# Patient Record
Sex: FEMALE | Race: BLACK OR AFRICAN AMERICAN | NOT HISPANIC OR LATINO | Employment: UNEMPLOYED | URBAN - METROPOLITAN AREA
[De-identification: names, ages, dates, MRNs, and addresses within clinical notes are randomized per-mention and may not be internally consistent; named-entity substitution may affect disease eponyms.]

---

## 2021-09-28 LAB
ABS NEUT (OLG): 4.33 X10(3)/MCL (ref 2.1–9.2)
BASOPHILS # BLD AUTO: 0.05 X10(3)/MCL (ref 0–0.2)
BASOPHILS NFR BLD AUTO: 0.8 % (ref 0–1)
BUN SERPL-MCNC: 9 MG/DL (ref 7–18.7)
CALCIUM SERPL-MCNC: 9.3 MG/DL (ref 8.4–10.2)
CHLORIDE SERPL-SCNC: 103 MMOL/L (ref 98–107)
CO2 SERPL-SCNC: 30 MMOL/L (ref 22–29)
CREAT SERPL-MCNC: 0.51 MG/DL (ref 0.57–1.11)
CREAT/UREA NIT SERPL: 18
EOSINOPHIL # BLD AUTO: 0.42 X10(3)/MCL (ref 0–0.9)
EOSINOPHIL NFR BLD AUTO: 6.8 % (ref 0–6.4)
ERYTHROCYTE [DISTWIDTH] IN BLOOD BY AUTOMATED COUNT: 20 % (ref 11.5–17)
GLUCOSE SERPL-MCNC: 105 MG/DL (ref 74–100)
HCT VFR BLD AUTO: 28.4 % (ref 37–47)
HGB BLD-MCNC: 8.5 GM/DL (ref 12–16)
HYPOCHROMIA BLD QL SMEAR: ABNORMAL
IMM GRANULOCYTES # BLD AUTO: 0.06 10*3/UL (ref 0–0.02)
IMM GRANULOCYTES NFR BLD AUTO: 1 % (ref 0–0.43)
LYMPHOCYTES # BLD AUTO: 0.44 X10(3)/MCL (ref 0.6–4.6)
LYMPHOCYTES NFR BLD AUTO: 7.2 % (ref 16–44)
MACROCYTES BLD QL SMEAR: ABNORMAL
MAGNESIUM SERPL-MCNC: 2 MG/DL (ref 1.6–2.6)
MCH RBC QN AUTO: 28.1 PG (ref 27–31)
MCHC RBC AUTO-ENTMCNC: 29.9 GM/DL (ref 33–36)
MCV RBC AUTO: 93.7 FL (ref 80–94)
MONOCYTES # BLD AUTO: 0.85 X10(3)/MCL (ref 0.1–1.3)
MONOCYTES NFR BLD AUTO: 13.8 % (ref 4–12.1)
NEUTROPHILS # BLD AUTO: 4.33 X10(3)/MCL (ref 2.1–9.2)
NEUTROPHILS NFR BLD AUTO: 70.4 % (ref 43–73)
NRBC BLD AUTO-RTO: 0 % (ref 0–0.2)
PHOSPHATE SERPL-MCNC: 5.5 MG/DL (ref 2.3–4.7)
PLATELET # BLD AUTO: 490 X10(3)/MCL (ref 130–400)
PLATELET # BLD EST: ABNORMAL 10*3/UL
PMV BLD AUTO: 9.3 FL (ref 7.4–10.4)
POLYCHROMASIA BLD QL SMEAR: SLIGHT
POTASSIUM SERPL-SCNC: 4 MMOL/L (ref 3.5–5.1)
PREALB SERPL-MCNC: 25.3 MG/DL (ref 16–38)
RBC # BLD AUTO: 3.03 X10(6)/MCL (ref 4.2–5.4)
RBC MORPH BLD: ABNORMAL
SODIUM SERPL-SCNC: 143 MMOL/L (ref 136–145)
TARGETS BLD QL SMEAR: SLIGHT
VANCOMYCIN SERPL-MCNC: 17.6 UG/ML
WBC # SPEC AUTO: 6.2 X10(3)/MCL (ref 4.5–11.5)

## 2021-09-29 LAB
ABS NEUT (OLG): 3.95 X10(3)/MCL (ref 2.1–9.2)
ALBUMIN SERPL-MCNC: 1.9 GM/DL (ref 3.5–5)
BUN SERPL-MCNC: 8.2 MG/DL (ref 7–18.7)
CALCIUM SERPL-MCNC: 9 MG/DL (ref 8.4–10.2)
CHLORIDE SERPL-SCNC: 104 MMOL/L (ref 98–107)
CO2 SERPL-SCNC: 26 MMOL/L (ref 22–29)
CREAT SERPL-MCNC: 0.7 MG/DL (ref 0.57–1.11)
ERYTHROCYTE [DISTWIDTH] IN BLOOD BY AUTOMATED COUNT: 19.1 % (ref 11.5–17)
GLUCOSE SERPL-MCNC: 93 MG/DL (ref 74–100)
HCT VFR BLD AUTO: 30.2 % (ref 37–47)
HGB BLD-MCNC: 8.9 GM/DL (ref 12–16)
HYPOCHROMIA BLD QL SMEAR: ABNORMAL
MACROCYTES BLD QL SMEAR: ABNORMAL
MAGNESIUM SERPL-MCNC: 1.8 MG/DL (ref 1.6–2.6)
MCH RBC QN AUTO: 28.1 PG (ref 27–31)
MCHC RBC AUTO-ENTMCNC: 29.5 GM/DL (ref 33–36)
MCV RBC AUTO: 95.3 FL (ref 80–94)
MICROCYTES BLD QL SMEAR: ABNORMAL
NRBC BLD AUTO-RTO: 0 % (ref 0–0.2)
PHOSPHATE SERPL-MCNC: 4.1 MG/DL (ref 2.3–4.7)
PLATELET # BLD AUTO: 413 X10(3)/MCL (ref 130–400)
PLATELET # BLD EST: ABNORMAL 10*3/UL
PMV BLD AUTO: 9 FL (ref 7.4–10.4)
POLYCHROMASIA BLD QL SMEAR: SLIGHT
POTASSIUM SERPL-SCNC: 3.7 MMOL/L (ref 3.5–5.1)
PREALB SERPL-MCNC: 23.7 MG/DL (ref 16–38)
RBC # BLD AUTO: 3.17 X10(6)/MCL (ref 4.2–5.4)
RBC MORPH BLD: ABNORMAL
RBC MORPH BLD: ABNORMAL
SODIUM SERPL-SCNC: 140 MMOL/L (ref 136–145)
TARGETS BLD QL SMEAR: SLIGHT
WBC # SPEC AUTO: 6.1 X10(3)/MCL (ref 4.5–11.5)

## 2021-09-30 LAB — VANCOMYCIN TROUGH SERPL-MCNC: 10.1 UG/ML (ref 5–10)

## 2021-10-01 LAB — VANCOMYCIN TROUGH SERPL-MCNC: 13.9 UG/ML (ref 5–10)

## 2021-10-03 LAB
FOLATE SERPL-MCNC: 13.8 NG/ML (ref 7–31.4)
VANCOMYCIN TROUGH SERPL-MCNC: 11.5 UG/ML (ref 5–10)
VIT B12 SERPL-MCNC: 604 PG/ML (ref 213–816)

## 2021-10-04 LAB — VANCOMYCIN TROUGH SERPL-MCNC: 12.1 UG/ML (ref 5–10)

## 2021-10-05 LAB — VANCOMYCIN TROUGH SERPL-MCNC: 14.4 UG/ML (ref 5–10)

## 2021-10-06 LAB — VANCOMYCIN TROUGH SERPL-MCNC: 23.2 UG/ML (ref 5–10)

## 2021-10-08 LAB — VANCOMYCIN TROUGH SERPL-MCNC: 14.1 UG/ML (ref 5–10)

## 2021-10-11 ENCOUNTER — HISTORICAL (OUTPATIENT)
Dept: ADMINISTRATIVE | Facility: HOSPITAL | Age: 46
End: 2021-10-11

## 2021-10-11 LAB
ABS NEUT (OLG): 3.45 X10(3)/MCL (ref 2.1–9.2)
ALBUMIN SERPL-MCNC: 2 GM/DL (ref 3.5–5)
BUN SERPL-MCNC: 5.4 MG/DL (ref 7–18.7)
CALCIUM SERPL-MCNC: 9 MG/DL (ref 8.4–10.2)
CHLORIDE SERPL-SCNC: 106 MMOL/L (ref 98–107)
CO2 SERPL-SCNC: 27 MMOL/L (ref 22–29)
CREAT SERPL-MCNC: 0.47 MG/DL (ref 0.57–1.11)
ERYTHROCYTE [DISTWIDTH] IN BLOOD BY AUTOMATED COUNT: 16.1 % (ref 11.5–17)
GLUCOSE SERPL-MCNC: 90 MG/DL (ref 74–100)
HCT VFR BLD AUTO: 32.2 % (ref 37–47)
HGB BLD-MCNC: 9.6 GM/DL (ref 12–16)
MAGNESIUM SERPL-MCNC: 1.8 MG/DL (ref 1.6–2.6)
MCH RBC QN AUTO: 27.9 PG (ref 27–31)
MCHC RBC AUTO-ENTMCNC: 29.8 GM/DL (ref 33–36)
MCV RBC AUTO: 93.6 FL (ref 80–94)
NRBC BLD AUTO-RTO: 0 % (ref 0–0.2)
PHOSPHATE SERPL-MCNC: 4.2 MG/DL (ref 2.3–4.7)
PLATELET # BLD AUTO: 469 X10(3)/MCL (ref 130–400)
PMV BLD AUTO: 8.9 FL (ref 7.4–10.4)
POTASSIUM SERPL-SCNC: 3.5 MMOL/L (ref 3.5–5.1)
PREALB SERPL-MCNC: 12.2 MG/DL (ref 16–38)
RBC # BLD AUTO: 3.44 X10(6)/MCL (ref 4.2–5.4)
SODIUM SERPL-SCNC: 143 MMOL/L (ref 136–145)
WBC # SPEC AUTO: 4.8 X10(3)/MCL (ref 4.5–11.5)

## 2021-10-18 LAB
ABS NEUT (OLG): 6.95 X10(3)/MCL (ref 2.1–9.2)
BASOPHILS # BLD AUTO: 0.03 X10(3)/MCL (ref 0–0.2)
BASOPHILS NFR BLD AUTO: 0.4 % (ref 0–1)
BUN SERPL-MCNC: 8.6 MG/DL (ref 7–18.7)
CALCIUM SERPL-MCNC: 9.7 MG/DL (ref 8.4–10.2)
CHLORIDE SERPL-SCNC: 98 MMOL/L (ref 98–107)
CO2 SERPL-SCNC: 29 MMOL/L (ref 22–29)
CREAT SERPL-MCNC: 0.65 MG/DL (ref 0.57–1.11)
CREAT/UREA NIT SERPL: 13
EOSINOPHIL # BLD AUTO: 0.05 X10(3)/MCL (ref 0–0.9)
EOSINOPHIL NFR BLD AUTO: 0.6 % (ref 0–6.4)
ERYTHROCYTE [DISTWIDTH] IN BLOOD BY AUTOMATED COUNT: 15.2 % (ref 11.5–17)
GLUCOSE SERPL-MCNC: 112 MG/DL (ref 74–100)
HCT VFR BLD AUTO: 34.8 % (ref 37–47)
HGB BLD-MCNC: 10.6 GM/DL (ref 12–16)
IMM GRANULOCYTES # BLD AUTO: 0.04 10*3/UL (ref 0–0.02)
IMM GRANULOCYTES NFR BLD AUTO: 0.5 % (ref 0–0.43)
LYMPHOCYTES # BLD AUTO: 0.38 X10(3)/MCL (ref 0.6–4.6)
LYMPHOCYTES NFR BLD AUTO: 4.6 % (ref 16–44)
MCH RBC QN AUTO: 27.8 PG (ref 27–31)
MCHC RBC AUTO-ENTMCNC: 30.5 GM/DL (ref 33–36)
MCV RBC AUTO: 91.3 FL (ref 80–94)
MONOCYTES # BLD AUTO: 0.8 X10(3)/MCL (ref 0.1–1.3)
MONOCYTES NFR BLD AUTO: 9.7 % (ref 4–12.1)
NEUTROPHILS # BLD AUTO: 6.95 X10(3)/MCL (ref 2.1–9.2)
NEUTROPHILS NFR BLD AUTO: 84.2 % (ref 43–73)
NRBC BLD AUTO-RTO: 0 % (ref 0–0.2)
PLATELET # BLD AUTO: 464 X10(3)/MCL (ref 130–400)
PMV BLD AUTO: 9.3 FL (ref 7.4–10.4)
POTASSIUM SERPL-SCNC: 4.9 MMOL/L (ref 3.5–5.1)
RBC # BLD AUTO: 3.81 X10(6)/MCL (ref 4.2–5.4)
SODIUM SERPL-SCNC: 136 MMOL/L (ref 136–145)
WBC # SPEC AUTO: 8.2 X10(3)/MCL (ref 4.5–11.5)

## 2021-10-19 LAB — PREALB SERPL-MCNC: 13.3 MG/DL (ref 16–38)

## 2021-10-25 LAB
ABS NEUT (OLG): 3.55 X10(3)/MCL (ref 2.1–9.2)
ALBUMIN SERPL-MCNC: 2.6 GM/DL (ref 3.5–5)
BUN SERPL-MCNC: 14.5 MG/DL (ref 7–18.7)
CALCIUM SERPL-MCNC: 10.4 MG/DL (ref 8.4–10.2)
CHLORIDE SERPL-SCNC: 99 MMOL/L (ref 98–107)
CO2 SERPL-SCNC: 27 MMOL/L (ref 22–29)
CREAT SERPL-MCNC: 0.65 MG/DL (ref 0.57–1.11)
ERYTHROCYTE [DISTWIDTH] IN BLOOD BY AUTOMATED COUNT: 14.7 % (ref 11.5–17)
GLUCOSE SERPL-MCNC: 98 MG/DL (ref 74–100)
HCT VFR BLD AUTO: 33.4 % (ref 37–47)
HGB BLD-MCNC: 10.5 GM/DL (ref 12–16)
MAGNESIUM SERPL-MCNC: 2.2 MG/DL (ref 1.6–2.6)
MCH RBC QN AUTO: 27.7 PG (ref 27–31)
MCHC RBC AUTO-ENTMCNC: 31.4 GM/DL (ref 33–36)
MCV RBC AUTO: 88.1 FL (ref 80–94)
NRBC BLD AUTO-RTO: 0 % (ref 0–0.2)
PHOSPHATE SERPL-MCNC: 5.6 MG/DL (ref 2.3–4.7)
PLATELET # BLD AUTO: 501 X10(3)/MCL (ref 130–400)
PMV BLD AUTO: 9.2 FL (ref 7.4–10.4)
POTASSIUM SERPL-SCNC: 4.3 MMOL/L (ref 3.5–5.1)
PREALB SERPL-MCNC: 19 MG/DL (ref 16–38)
RBC # BLD AUTO: 3.79 X10(6)/MCL (ref 4.2–5.4)
SODIUM SERPL-SCNC: 138 MMOL/L (ref 136–145)
WBC # SPEC AUTO: 5.1 X10(3)/MCL (ref 4.5–11.5)

## 2021-11-05 ENCOUNTER — HISTORICAL (OUTPATIENT)
Dept: ADMINISTRATIVE | Facility: HOSPITAL | Age: 46
End: 2021-11-05

## 2021-11-15 ENCOUNTER — TELEPHONE (OUTPATIENT)
Dept: ORTHOPEDICS | Facility: CLINIC | Age: 46
End: 2021-11-15

## 2021-12-06 ENCOUNTER — HISTORICAL (OUTPATIENT)
Dept: ADMINISTRATIVE | Facility: HOSPITAL | Age: 46
End: 2021-12-06

## 2021-12-06 LAB
ABS NEUT (OLG): 3.06 X10(3)/MCL (ref 2.1–9.2)
ALBUMIN SERPL-MCNC: 3.2 GM/DL (ref 3.5–5)
ALBUMIN/GLOB SERPL: 0.6 RATIO (ref 1.1–2)
ALP SERPL-CCNC: 379 UNIT/L (ref 40–150)
ALT SERPL-CCNC: 125 UNIT/L (ref 0–55)
AST SERPL-CCNC: 65 UNIT/L (ref 5–34)
BASOPHILS # BLD AUTO: 0 X10(3)/MCL (ref 0–0.2)
BASOPHILS NFR BLD AUTO: 0 %
BILIRUB SERPL-MCNC: 0.4 MG/DL
BILIRUBIN DIRECT+TOT PNL SERPL-MCNC: 0.2 MG/DL (ref 0–0.5)
BILIRUBIN DIRECT+TOT PNL SERPL-MCNC: 0.2 MG/DL (ref 0–0.8)
BUN SERPL-MCNC: 12.5 MG/DL (ref 7–18.7)
CALCIUM SERPL-MCNC: 10.8 MG/DL (ref 8.7–10.5)
CHLORIDE SERPL-SCNC: 103 MMOL/L (ref 98–107)
CHOLEST SERPL-MCNC: 281 MG/DL
CHOLEST/HDLC SERPL: 8 {RATIO} (ref 0–5)
CO2 SERPL-SCNC: 25 MMOL/L (ref 22–29)
CREAT SERPL-MCNC: 0.55 MG/DL (ref 0.55–1.02)
DEPRECATED CALCIDIOL+CALCIFEROL SERPL-MC: 31.1 NG/ML (ref 30–80)
EOSINOPHIL # BLD AUTO: 0.3 X10(3)/MCL (ref 0–0.9)
EOSINOPHIL NFR BLD AUTO: 7 %
ERYTHROCYTE [DISTWIDTH] IN BLOOD BY AUTOMATED COUNT: 16.4 % (ref 11.5–14.5)
EST. AVERAGE GLUCOSE BLD GHB EST-MCNC: 93.9 MG/DL
GLOBULIN SER-MCNC: 5.2 GM/DL (ref 2.4–3.5)
GLUCOSE SERPL-MCNC: 85 MG/DL (ref 74–100)
HBA1C MFR BLD: 4.9 %
HCT VFR BLD AUTO: 36.2 % (ref 35–46)
HDLC SERPL-MCNC: 35 MG/DL (ref 35–60)
HGB BLD-MCNC: 11.6 GM/DL (ref 12–16)
IMM GRANULOCYTES # BLD AUTO: 0.02 10*3/UL
IMM GRANULOCYTES NFR BLD AUTO: 0 %
LDLC SERPL CALC-MCNC: 211 MG/DL (ref 50–140)
LYMPHOCYTES # BLD AUTO: 0.4 X10(3)/MCL (ref 0.6–4.6)
LYMPHOCYTES NFR BLD AUTO: 10 %
MCH RBC QN AUTO: 27.7 PG (ref 26–34)
MCHC RBC AUTO-ENTMCNC: 32 GM/DL (ref 31–37)
MCV RBC AUTO: 86.4 FL (ref 80–100)
MONOCYTES # BLD AUTO: 0.5 X10(3)/MCL (ref 0.1–1.3)
MONOCYTES NFR BLD AUTO: 12 %
NEUTROPHILS # BLD AUTO: 3.06 X10(3)/MCL (ref 2.1–9.2)
NEUTROPHILS NFR BLD AUTO: 70 %
NRBC BLD AUTO-RTO: 0 % (ref 0–0.2)
PLATELET # BLD AUTO: 493 X10(3)/MCL (ref 130–400)
PMV BLD AUTO: 8.9 FL (ref 7.4–10.4)
POTASSIUM SERPL-SCNC: 4.7 MMOL/L (ref 3.5–5.1)
PROT SERPL-MCNC: 8.4 GM/DL (ref 6.4–8.3)
RBC # BLD AUTO: 4.19 X10(6)/MCL (ref 4–5.2)
SODIUM SERPL-SCNC: 137 MMOL/L (ref 136–145)
T4 FREE SERPL-MCNC: 0.94 NG/DL (ref 0.7–1.48)
TRIGL SERPL-MCNC: 175 MG/DL (ref 37–140)
TSH SERPL-ACNC: 1.02 UIU/ML (ref 0.35–4.94)
VIT B12 SERPL-MCNC: 353 PG/ML (ref 213–816)
VLDLC SERPL CALC-MCNC: 35 MG/DL
WBC # SPEC AUTO: 4.4 X10(3)/MCL (ref 4.5–11)

## 2021-12-22 ENCOUNTER — HISTORICAL (OUTPATIENT)
Dept: RADIOLOGY | Facility: HOSPITAL | Age: 46
End: 2021-12-22

## 2022-04-11 ENCOUNTER — HISTORICAL (OUTPATIENT)
Dept: ADMINISTRATIVE | Facility: HOSPITAL | Age: 47
End: 2022-04-11
Payer: MEDICAID

## 2022-04-25 VITALS
OXYGEN SATURATION: 96 % | HEIGHT: 61 IN | SYSTOLIC BLOOD PRESSURE: 112 MMHG | WEIGHT: 183 LBS | BODY MASS INDEX: 34.55 KG/M2 | DIASTOLIC BLOOD PRESSURE: 78 MMHG

## 2022-05-05 NOTE — HISTORICAL [facility] CERNER
This is a historical note converted from Alicia. Formatting and pictures may have been removed.  Please reference Alicia for original formatting and attached multimedia. Chief Complaint  Transferred to [facility] for continuation of?wound care?from [facility]  History of Present Illness  ?45-year-old -American female developed a?lesion to her left thigh?and March 2021.? 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 ?On May 18, 2021?patient developed significant shortness of breath?and presented to?[facility]?she was noted to have a hemoglobin of 3 and transferred to [facility]?for?further treatment?where she has remained there until?9/27/2021?at which time she was transferred to [facility] here.? She had a complicated prolonged stay at [facility]?her course was complicated by severe anemia?requiring transfusions,?and IVC filter placement.? She did have embolization of the sarcoma with radiation therapy which was completed on July 2, 2021.? She was treated?with a few sessions of hyperbaric oxygen therapy.? She developed a?left thigh/inguinal wound?and the initial plans for surgical intervention?changed?to?a hip disarticulation?in August 2021. While in the OR?for hip disarticulation surgery,?it was discovered she did not have enough healthy tissue?to allow for disarticulation?so a debulking surgery was performed instead with no plan to attempt?wound healing prior to another surgical attempt.? She underwent?a total of 5 operative debridements?between?August 13 and August 25. ?She apparently suffered a tear of the femoral artery requiring?repair with wound closure and drain placement and?8/28/2021. ?Patient went?in?she was treated with courses of IV antibiotics including vancomycin and cefepime?with Flagyl.? She was also?started on cefazolin for cellulitis of the left lower extremity with  end date?on October 9.? During her?hospitalization she also developed a sacral pressure ulcer and pressure ulcer to the?posterior thigh/buttock region.? She was transferred to [facility]?9/27/2021 for continuation of care.? Of note patient is originally from [place]  ?   Wound medicine consulted?9/29/2021 by the wound care RN to?evaluate and treat?wounds to sacrum, left buttock and thigh,?left hip?and the radiation wound to the left?inguinal region.? She was transferred to [facility] still with a?PATRICIA drain in place to her left hip?and?sutures in place to the anterior and posterior thigh where the hip disarticulation surgery was attempted.? She was placed on low-air-loss mattress.  ?   Today: Patient seen today for initial consult.? Spent a good bit of time reviewing history with patient. ?She?reports pain with turning and repositioning and was tearful?as soon as we moved her?to assess her buttock. ?She was premedicated with MS Contin prior to the wound assessment. ?She reports appetite is good. ?Discussed with physical therapy they got her to chair yesterday and already ordered a Lee mat?and limited sitting time to 1 hour at a time.? Patient request no silver alginate dressing states that they burn and?does not want to use that treatment.  ?  Review of Systems  Negative except as detailed in HPI, all other systems reviewed and negative  Physical Exam  Vitals & Measurements  T:?37.0? ?C (Oral)? TMIN:?36.4? ?C (Oral)? TMAX:?37.0? ?C (Oral)? HR:?129(Peripheral)? RR:?18? BP:?137/88? SpO2:?97%?  General: Pleasant young female, NAD except if wounds are are touched or she is repositioned. ?She was tearful at the time of exam  Neurologic/Psychiatric: Patient is alert and oriented to person, place, time. Cooperative. Cranial nerves II-XII intact.  Head/Eyes/Ears/Nose/Mouth/Throat:? Normocephalic, atraumatic.  Cardiovascular: No JVD. ?Regular rate and rhythm.?  Peripheral vascular: RLE?with trace edema. ?No discoloration or cellulitis  present. 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  Respiratory: Respirations even and unlabored, no use of accessory muscles. Trachea midline.??  Gastrointestinal: Abdomen round, symmetric.??  Genitourinary:?Some urinary incontinence?with contamination of wounds  Musculoskeletal:FROM; motion of the?left?lower extremity stump limited by pain  Skin: Warm/dry.? PATRICIA drain in place to the left hip region with serosanguineous drainage.? Anterior and posterior thigh surgical incisions with?sutures in place.??Left?inguinal/groin?wound. ?Sacral wound with dressing?and a left?buttock/posterior thigh wound present.  ?  WOUND # 1 sacrum pressure ulcer stage III:?1.1 x 0.5 x 0.9 cm with?undermining from 11 to 1 oclock position at 2.0 cm.? Wound bed with red granulation tissue,?and serosanguineous drainage. ?No periwound maceration, induration or cellulitis.? No odor. ?No underlying structures visualized.  ?  WOUND #2 left groin radiation?wound:?4 x 4 x 0.1 cm.? Irregular shaped wound with waxy yellow-white?fibrotic?coating with scant serosanguineous drainage.? Periwound with thin skin. ?No underlying structures exposed.? No odor. ?No periwound maceration, induration or cellulitis  ?  WOUND # 3 left posterior thigh surgical wound:?0.2 x 16 x 3.0 cm.? Horizontally oriented?incision?along the stump of the amputation?with sutures in place. ?A few areas?of dehiscence that?are able to probe deeper into the wound, no drainage, no redness heat or discoloration around?the incision line.? No lateral tracking  ?  WOUND # 4 left buttock/posterior thigh stage III pressure ulcer:?14.5 x 12.5 x 0.1 cm.? Large wound extending from the buttocks down to the posterior thigh?with 50% red granulation, 50%?slough?with scant serosanguineous?in nature.? No underlying structures exposed. ?No periwound maceration, induration or cellulitis  ?  WOUND # 5 left anterior thigh surgical wound:?11.3 x 0.1 x 0.1?cm.? Vertically oriented  incision?at the anterior thigh?amputation site?with sutures in place.??Edges are well approximated?with no drainage, no discoloration, redness or odor.  ?  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  ?- embolization of LLE May 2021  ?- s/p?resection of mass/radiation treatment July 2021  ?-Developed a wound ultimately required?attempted left hip disarticulation 8/4/21-unable to complete only debulking surgery  ?- s/p multiple operative debridements?August 2021 and?a few HBOT sessions  ?-Left anterior and posterior?thigh surgical?wounds. ?Posterior?incision with some dehiscence  femoral artery tear wound closure and drain placement 8/28/21  History of?severe anemia requiring transfusions  History of?PE/IVC filter  Stage III sacral pressure ulcer POA  Left?buttock/thigh?stage III pressure ulcer POA  Left groin radiation wound  Left lower extremity cellulitis planned for?ABX until 10/9/2021 -cellulitis resolved  Prolonged complex hospitalization [facility] 5/18/21-9/27/21  Mobility impairment/generalized weakness  Tachycardia  ?  PLAN:  ?1. ?Wounds were all assessed and redressed.? I spent a prolonged period of time trying to review charts and gather information to clarify the timeline of?all of the significant events?in her prolonged?complex hospitalization.? I did not attempt to debride any of her wounds she was already crying and in pain?with just turning and repositioning her?and was not due for any further?IV pain meds?at the time?so we will initiate wound?treatments and then?reevaluate need for debridement next week.? She has a left groin radiation wound?with?pressure ulcers to the sacrum and buttocks/thigh.? She still has a surgical?drain in place to the left hip with?surgical sutures at the hip disarticulation site. ?There is some?dehiscence at the posterior?surgical?incision?but no necrosis, drainage or?evidence of infection at this time.? Continue to monitor  closely  ?2. Wound Care Orders:?Sacrum-collagen, bordered foam every other day.? Left buttock/thigh-Santyl, saline moist gauze, ABD?daily.? ABD pads?over the?anterior and posterior?surgical thigh wounds.? Left groin?plain alginate, border foam every other day.  ?3. Monitor for S & S of infection/deterioration.? Afebrile. ?No WBCs. ?Wounds do not appear acutely infected  ?4. Increase protein intake as renal functions allow and optimize nutrition to promote wound healing. ?Nutritionist consulted.? PAB currently?at goal 23?need to maintain current nutritional status.  ?5. Pressure offloading:?Low-air-loss mattress,?Lee mat, limit sitting time less than 1 hour 3 times daily with meals. ?Turn every 2 hours with wedge and float heel?while in bed. ?Mobilization with therapy as able, discussed with PT  ?6. Incontinence care: Needs pure wick if okay with attending,?no briefs, limit layers under patient  ?7. Aggressive glucose control per attending, no history of diabetes  ?8. Education:?Offloading, nutrition, infection control, wound care  ?9. Plan of care discussed with patient who verbalized understanding of plan?  ?10. ?We will follow up at least weekly while admitted  11. ?Prognosis guarded  ?   Total time spent for evaluation and management of care?100 minutes.  ?   Problem List/Past Medical History  Ongoing  MRSA (Methicillin resistant Staphylococcus aureus) infection  Stage 3 or 4 pressure ulcers acquired after admission to a healthcare facility  VRE (vancomycin resistant Enterococcus) DNA  Historical  No qualifying data  Procedure/Surgical History  I/D WITH FEMORAL ARTERY TEAR, WOUND CLOSURE AND DRAIN PLACEMENT (08/28/2021)  LEFT HIP DISARTICULATION (08/04/2021)  RESECTION OF LEFT MASS (07/14/2021)  IVC FILTER VIA IJ (05/22/2021)  ANGIO POSSIBLE EMBOLIZATION LLE (05/19/2021)  RADIATION THERAPY   Medications  Inpatient  acetaminophen 325 mg oral tablet, 650 mg= 2 tab(s), Oral, q4hr, PRN  apixaban 5 mg oral tablet, 5  mg= 1 tab(s), Oral, BID  cefepime  fluticasone 50 mcg/inh inhalation powder, 2 spray, INH, BID  fluticasone 50 mcg/inh nasal spray, 100 mcg= 2 spray(s), Both Nostrils, Daily  gabapentin 600 mg oral tablet, 600 mg= 1 tab(s), Oral, TID  labetalol 5 mg/mL intravenous solution, 10 mg= 2 mL, IV Push, q2hr, PRN  magnesium oxide 400 mg oral tablet, 400 mg= 1 tab(s), Oral, Daily  metronidazole 500 mg oral tablet, 500 mg= 1 tab(s), Oral, [id number]  MiraLax (polyethylene glycol 3350), 17 gm= 1 packet(s), Oral, TID  morphine 10 mg/mL preservative-free intravenous solution, 2 mg= 0.2 mL, IV Push, q6hr, PRN  MS Contin, 15 mg= 1 tab(s), Oral, BID  ondansetron, 4 mg= 2 mL, IV Push, q6hr, PRN  oxyCODONE, 20 mg= 2 tab(s), Oral, q6hr, PRN  Pepcid 20 mg oral tablet, 20 mg= 1 tab(s), Oral, BID, PRN  potassium bicarbonate, 50 mEq= 2 tab(s), Oral, Daily  Sodium Chloride 0.9% Normal Saline Flush, 10 mL, IV Push, q12hr  Sodium Chloride 0.9% Normal Saline Flush, 10 mL, IV Push, BID, PRN  tamsulosin 0.4 mg oral capsule, 0.4 mg= 1 cap(s), Oral, Daily  vancomycin  Home  acetaminophen, 650 mg, Oral, q6hr  apixaban 5 mg oral tablet, 5 mg= 1 tab(s), Oral, BID  cefepime 2 g injection, 2 gm, IV Piggyback, q12hr  fluticasone 50 mcg/inh inhalation powder, 2 spray(s), INH, BID  gabapentin 600 mg oral tablet, 600 mg= 1 tab(s), Oral, TID  magnesium oxide 400 mg oral tablet, 400 mg= 1 tab(s), Oral, Daily  metronidazole 500 mg oral tablet, 500 mg= 1 tab(s), Oral, q8hr  MS Contin, 75 mg, Oral, q12hr  polyethylene glycol 3350 ( MiraLax ), 17 gm, Oral, TID  potassium bicarbonate, 50 mEq, Oral, Daily  tamsulosin 0.4 mg oral capsule, 0.4 mg= 1 cap(s), Oral, Daily  vancomycin 750 mg intravenous injection, 750 mg, IV Piggyback, q12hr  Allergies  penicillin?(Itching)  Social History  Abuse/Neglect  No, No, Yes, 09/27/2021  Tobacco  Never (less than 100 in lifetime), N/A, 09/27/2021  Lab Results  Labs Last 72 Hours?  ?Chemistry? Hematology/Coagulation?   Sodium  Lvl: 140 mmol/L (09/29/21 03:25:00) WBC: 6.1 x10(3)/mcL (09/29/21 03:25:00)   Sodium Lvl: 143 mmol/L (09/28/21 03:11:00) WBC: 6.2 x10(3)/mcL (09/28/21 03:11:00)   Potassium Lvl: 3.7 mmol/L (09/29/21 03:25:00) RBC:?3.17 x10(6)/mcL?Low (09/29/21 03:25:00)   Potassium Lvl: 4 mmol/L (09/28/21 03:11:00) RBC:?3.03 x10(6)/mcL?Low (09/28/21 03:11:00)   Chloride: 104 mmol/L (09/29/21 03:25:00) Hgb:?8.9 gm/dL?Low (09/29/21 03:25:00)   Chloride: 103 mmol/L (09/28/21 03:11:00) Hgb:?8.5 gm/dL?Low (09/28/21 03:11:00)   CO2: 26 mmol/L (09/29/21 03:25:00) Hct:?30.2 %?Low (09/29/21 03:25:00)   CO2:?30 mmol/L?High (09/28/21 03:11:00) Hct:?28.4 %?Low (09/28/21 03:11:00)   Calcium Lvl: 9 mg/dL (09/29/21 03:25:00) Platelet:?413 x10(3)/mcL?High (09/29/21 03:25:00)   Calcium Lvl: 9.3 mg/dL (09/28/21 03:11:00) Platelet:?490 x10(3)/mcL?High (09/28/21 03:11:00)   Magnesium Lvl: 1.8 mg/dL (09/29/21 03:25:00) MCV:?95.3 fL?High (09/29/21 03:25:00)   Magnesium Lvl: 2 mg/dL (09/28/21 03:11:00) MCV: 93.7 fL (09/28/21 03:11:00)   Glucose Lvl: 93 mg/dL (09/29/21 03:25:00) MCH: 28.1 pg (09/29/21 03:25:00)   Glucose Lvl:?105 mg/dL?High (09/28/21 03:11:00) MCH: 28.1 pg (09/28/21 03:11:00)   BUN: 8.2 mg/dL (09/29/21 03:25:00) MCHC:?29.5 gm/dL?Low (09/29/21 03:25:00)   BUN: 9 mg/dL (09/28/21 03:11:00) MCHC:?29.9 gm/dL?Low (09/28/21 03:11:00)   Creatinine: 0.7 mg/dL (09/29/21 03:25:00) RDW:?19.1 %?High (09/29/21 03:25:00)   Creatinine:?0.51 mg/dL?Low (09/28/21 03:11:00) RDW:?20 %?High (09/28/21 03:11:00)   Est Creat Clearance Ser: 73.56 mL/min (09/29/21 03:56:02) MPV: 9 fL (09/29/21 03:25:00)   Est Creat Clearance Ser: 100.97 mL/min (09/28/21 04:06:28) MPV: 9.3 fL (09/28/21 03:11:00)   BUN/Creat Ratio: 18 (09/28/21 03:11:00) Abs Neut: 3.95 x10(3)/mcL (09/29/21 03:25:00)   eGFR-AA: >60 (09/29/21 03:25:00) Abs Neut: 4.33 x10(3)/mcL (09/28/21 03:11:00)   eGFR-AA: >60 (09/28/21 03:11:00) Neutro Auto: 70.4 % (09/28/21 03:11:00)   eGFR-EWA: >60 (09/29/21  03:25:00) Lymph Auto:?7.2 %?Low (09/28/21 03:11:00)   eGFR-EWA: >60 (09/28/21 03:11:00) Mono Auto:?13.8 %?High (09/28/21 03:11:00)   Albumin Lvl:?1.9 gm/dL?Low (09/29/21 03:25:00) Eos Auto:?6.8 %?High (09/28/21 03:11:00)   Phosphorus: 4.1 mg/dL (09/29/21 03:25:00) Abs Eos: 0.42 x10(3)/mcL (09/28/21 03:11:00)   Phosphorus:?5.5 mg/dL?High (09/28/21 03:11:00) Basophil Auto: 0.8 % (09/28/21 03:11:00)   Prealbumin: 23.7 mg/dL (09/29/21 03:25:00) Abs Neutro: 4.33 x10(3)/mcL (09/28/21 03:11:00)   Prealbumin: 25.3 mg/dL (09/28/21 03:11:00) Abs Lymph:?0.44 x10(3)/mcL?Low (09/28/21 03:11:00)    Abs Mono: 0.85 x10(3)/mcL (09/28/21 03:11:00)    Abs Baso: 0.05 x10(3)/mcL (09/28/21 03:11:00)    NRBC%: 0.0 (09/29/21 03:25:00)    NRBC%: 0.0 (09/28/21 03:11:00)    IG%:?1 %?High (09/28/21 03:11:00)    IG#:?0.06?High (09/28/21 03:11:00)    Hypochrom: 2+ (09/29/21 03:25:00)    Hypochrom: 2+ (09/28/21 03:11:00)    Platelet Est: Increased Abnormal (09/29/21 03:25:00)    Platelet Est: Increased Abnormal (09/28/21 03:11:00)    Microcyte: 1+ (09/29/21 03:25:00)    Macrocyte: 1+ (09/29/21 03:25:00)    Macrocyte: 1+ (09/28/21 03:11:00)    Polychrom: Slight (09/29/21 03:25:00)    Polychrom: Slight (09/28/21 03:11:00)    RBC Morph: Abnormal Abnormal (09/29/21 03:25:00)    RBC Morph: Abnormal (09/29/21 03:25:00)    RBC Morph: Abnormal Abnormal (09/28/21 03:11:00)    Target Cell: Slight (09/29/21 03:25:00)    Target Cell: Slight (09/28/21 03:11:00)    Stomatocytes: 1+ (09/29/21 03:25:00)    Stomatocytes: 1+ (09/28/21 03:11:00)